# Patient Record
Sex: FEMALE | Race: WHITE | HISPANIC OR LATINO | URBAN - METROPOLITAN AREA
[De-identification: names, ages, dates, MRNs, and addresses within clinical notes are randomized per-mention and may not be internally consistent; named-entity substitution may affect disease eponyms.]

---

## 2017-01-03 ENCOUNTER — FOLLOW UP ESTABLISHED (OUTPATIENT)
Dept: URBAN - METROPOLITAN AREA CLINIC 64 | Facility: CLINIC | Age: 27
End: 2017-01-03
Payer: COMMERCIAL

## 2017-01-03 PROCEDURE — 92015 DETERMINE REFRACTIVE STATE: CPT | Performed by: OPTOMETRIST

## 2017-01-03 PROCEDURE — 92014 COMPRE OPH EXAM EST PT 1/>: CPT | Performed by: OPTOMETRIST

## 2017-01-03 ASSESSMENT — KERATOMETRY
OS: 45.53
OD: 46.35

## 2017-01-03 ASSESSMENT — INTRAOCULAR PRESSURE
OD: 10
OS: 10

## 2017-01-03 ASSESSMENT — VISUAL ACUITY
OS: 20/20
OD: 20/20

## 2018-04-20 ENCOUNTER — FOLLOW UP ESTABLISHED (OUTPATIENT)
Dept: URBAN - METROPOLITAN AREA CLINIC 64 | Facility: CLINIC | Age: 28
End: 2018-04-20
Payer: COMMERCIAL

## 2018-04-20 DIAGNOSIS — H52.223 REGULAR ASTIGMATISM, BILATERAL: Primary | ICD-10-CM

## 2018-04-20 DIAGNOSIS — H33.21 SEROUS RETINAL DETACHMENT, RIGHT EYE: ICD-10-CM

## 2018-04-20 PROCEDURE — 92015 DETERMINE REFRACTIVE STATE: CPT | Performed by: OPTOMETRIST

## 2018-04-20 PROCEDURE — 92014 COMPRE OPH EXAM EST PT 1/>: CPT | Performed by: OPTOMETRIST

## 2018-04-20 ASSESSMENT — INTRAOCULAR PRESSURE
OS: 14
OD: 20

## 2018-04-20 ASSESSMENT — VISUAL ACUITY
OS: 20/20
OD: 20/20

## 2018-04-20 ASSESSMENT — KERATOMETRY
OS: 45.70
OD: 46.24

## 2019-07-19 ENCOUNTER — FOLLOW UP ESTABLISHED (OUTPATIENT)
Dept: URBAN - METROPOLITAN AREA CLINIC 64 | Facility: CLINIC | Age: 29
End: 2019-07-19
Payer: COMMERCIAL

## 2019-07-19 PROCEDURE — 92014 COMPRE OPH EXAM EST PT 1/>: CPT | Performed by: OPTOMETRIST

## 2019-07-19 PROCEDURE — 92015 DETERMINE REFRACTIVE STATE: CPT | Performed by: OPTOMETRIST

## 2019-07-19 ASSESSMENT — KERATOMETRY
OD: 46.37
OS: 45.76

## 2019-07-19 ASSESSMENT — INTRAOCULAR PRESSURE
OD: 15
OS: 12

## 2019-07-19 ASSESSMENT — VISUAL ACUITY
OD: 20/20
OS: 20/20

## 2020-09-21 ENCOUNTER — FOLLOW UP ESTABLISHED (OUTPATIENT)
Dept: URBAN - METROPOLITAN AREA CLINIC 64 | Facility: CLINIC | Age: 30
End: 2020-09-21
Payer: COMMERCIAL

## 2020-09-21 PROCEDURE — 92014 COMPRE OPH EXAM EST PT 1/>: CPT | Performed by: OPTOMETRIST

## 2020-09-21 PROCEDURE — 92015 DETERMINE REFRACTIVE STATE: CPT | Performed by: OPTOMETRIST

## 2020-09-21 ASSESSMENT — KERATOMETRY
OS: 45.55
OD: 46.36

## 2020-09-21 ASSESSMENT — VISUAL ACUITY
OS: 20/20
OD: 20/20

## 2020-09-21 ASSESSMENT — INTRAOCULAR PRESSURE
OS: 15
OD: 15

## 2023-01-13 ENCOUNTER — OFFICE VISIT (OUTPATIENT)
Dept: URBAN - METROPOLITAN AREA CLINIC 64 | Facility: CLINIC | Age: 33
End: 2023-01-13
Payer: COMMERCIAL

## 2023-01-13 DIAGNOSIS — H33.21 SEROUS RETINAL DETACHMENT, RIGHT EYE: ICD-10-CM

## 2023-01-13 DIAGNOSIS — H52.13 MYOPIA, BILATERAL: Primary | ICD-10-CM

## 2023-01-13 PROCEDURE — 92310 CONTACT LENS FITTING OU: CPT

## 2023-01-13 PROCEDURE — 92014 COMPRE OPH EXAM EST PT 1/>: CPT

## 2023-01-13 ASSESSMENT — INTRAOCULAR PRESSURE
OS: 17
OD: 16

## 2023-01-13 ASSESSMENT — KERATOMETRY
OS: 45.53
OD: 45.83

## 2023-01-13 ASSESSMENT — VISUAL ACUITY
OD: 20/20
OS: 20/20

## 2023-01-13 NOTE — IMPRESSION/PLAN
Impression: Myopia, bilateral: H52.13. Plan: Patient educated. Updated SRx released to patient. Monitor yearly Pt interested in LASIK, okay to schedule consult.

## 2023-01-13 NOTE — IMPRESSION/PLAN
Impression: Retinal detachment of right eye Plan: Old inferior RD with pigment at the border. Appears stable no changes. Pt. educated on s/s new RD. Call if any symptoms experienced.

## 2024-02-07 ENCOUNTER — OFFICE VISIT (OUTPATIENT)
Dept: URBAN - METROPOLITAN AREA CLINIC 64 | Facility: LOCATION | Age: 34
End: 2024-02-07
Payer: COMMERCIAL

## 2024-02-07 DIAGNOSIS — H33.21 SEROUS RETINAL DETACHMENT, RIGHT EYE: ICD-10-CM

## 2024-02-07 DIAGNOSIS — H52.13 MYOPIA, BILATERAL: Primary | ICD-10-CM

## 2024-02-07 PROCEDURE — 92014 COMPRE OPH EXAM EST PT 1/>: CPT

## 2024-02-07 ASSESSMENT — INTRAOCULAR PRESSURE
OS: 17
OD: 15

## 2024-02-07 ASSESSMENT — VISUAL ACUITY
OD: 20/20
OS: 20/20